# Patient Record
Sex: FEMALE | Race: WHITE | NOT HISPANIC OR LATINO | Employment: FULL TIME | URBAN - METROPOLITAN AREA
[De-identification: names, ages, dates, MRNs, and addresses within clinical notes are randomized per-mention and may not be internally consistent; named-entity substitution may affect disease eponyms.]

---
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
## 2023-01-10 ENCOUNTER — HOSPITAL ENCOUNTER (EMERGENCY)
Facility: HOSPITAL | Age: 18
Discharge: HOME | End: 2023-01-10
Attending: EMERGENCY MEDICINE | Admitting: EMERGENCY MEDICINE
Payer: COMMERCIAL

## 2023-01-10 ENCOUNTER — APPOINTMENT (OUTPATIENT)
Dept: CT IMAGING | Facility: HOSPITAL | Age: 18
End: 2023-01-10
Payer: COMMERCIAL

## 2023-01-10 ENCOUNTER — APPOINTMENT (OUTPATIENT)
Dept: GENERAL RADIOLOGY | Facility: HOSPITAL | Age: 18
End: 2023-01-10
Payer: COMMERCIAL

## 2023-01-10 VITALS
TEMPERATURE: 98 F | BODY MASS INDEX: 31.86 KG/M2 | WEIGHT: 203 LBS | SYSTOLIC BLOOD PRESSURE: 136 MMHG | HEART RATE: 76 BPM | HEIGHT: 67 IN | OXYGEN SATURATION: 99 % | RESPIRATION RATE: 16 BRPM | DIASTOLIC BLOOD PRESSURE: 76 MMHG

## 2023-01-10 DIAGNOSIS — R22.1 NECK SWELLING: ICD-10-CM

## 2023-01-10 DIAGNOSIS — B34.9 VIRAL SYNDROME: ICD-10-CM

## 2023-01-10 DIAGNOSIS — M54.50 ACUTE BILATERAL LOW BACK PAIN WITHOUT SCIATICA: Primary | ICD-10-CM

## 2023-01-10 LAB
ALBUMIN SERPL-MCNC: 4.6 G/DL (ref 3.5–5.2)
ALBUMIN/GLOB SERPL: 1.8 G/DL
ALP SERPL-CCNC: 56 U/L (ref 39–117)
ALT SERPL W P-5'-P-CCNC: 13 U/L (ref 1–33)
ANION GAP SERPL CALCULATED.3IONS-SCNC: 12.6 MMOL/L (ref 5–15)
AST SERPL-CCNC: 14 U/L (ref 1–32)
B-HCG UR QL: NEGATIVE
BASOPHILS # BLD AUTO: 0.04 10*3/MM3 (ref 0–0.2)
BASOPHILS NFR BLD AUTO: 0.6 % (ref 0–1.5)
BILIRUB SERPL-MCNC: 0.9 MG/DL (ref 0–1.2)
BILIRUB UR QL STRIP: ABNORMAL
BUN SERPL-MCNC: 13 MG/DL (ref 6–20)
BUN/CREAT SERPL: 17.1 (ref 7–25)
CALCIUM SPEC-SCNC: 9.8 MG/DL (ref 8.6–10.5)
CHLORIDE SERPL-SCNC: 102 MMOL/L (ref 98–107)
CLARITY UR: CLEAR
CO2 SERPL-SCNC: 22.4 MMOL/L (ref 22–29)
COLOR UR: YELLOW
CREAT SERPL-MCNC: 0.76 MG/DL (ref 0.57–1)
DEPRECATED RDW RBC AUTO: 40.9 FL (ref 37–54)
EGFRCR SERPLBLD CKD-EPI 2021: 115.9 ML/MIN/1.73
EOSINOPHIL # BLD AUTO: 0.03 10*3/MM3 (ref 0–0.4)
EOSINOPHIL NFR BLD AUTO: 0.4 % (ref 0.3–6.2)
ERYTHROCYTE [DISTWIDTH] IN BLOOD BY AUTOMATED COUNT: 13.2 % (ref 12.3–15.4)
FLUAV SUBTYP SPEC NAA+PROBE: NOT DETECTED
FLUBV RNA ISLT QL NAA+PROBE: NOT DETECTED
GLOBULIN UR ELPH-MCNC: 2.6 GM/DL
GLUCOSE SERPL-MCNC: 71 MG/DL (ref 65–99)
GLUCOSE UR STRIP-MCNC: NEGATIVE MG/DL
HCT VFR BLD AUTO: 45.1 % (ref 34–46.6)
HETEROPH AB SER QL LA: NEGATIVE
HGB BLD-MCNC: 14.9 G/DL (ref 12–15.9)
HGB UR QL STRIP.AUTO: NEGATIVE
IMM GRANULOCYTES # BLD AUTO: 0 10*3/MM3 (ref 0–0.05)
IMM GRANULOCYTES NFR BLD AUTO: 0 % (ref 0–0.5)
KETONES UR QL STRIP: ABNORMAL
LEUKOCYTE ESTERASE UR QL STRIP.AUTO: NEGATIVE
LYMPHOCYTES # BLD AUTO: 2.35 10*3/MM3 (ref 0.7–3.1)
LYMPHOCYTES NFR BLD AUTO: 32.6 % (ref 19.6–45.3)
MCH RBC QN AUTO: 27.7 PG (ref 26.6–33)
MCHC RBC AUTO-ENTMCNC: 33 G/DL (ref 31.5–35.7)
MCV RBC AUTO: 83.8 FL (ref 79–97)
MONOCYTES # BLD AUTO: 0.4 10*3/MM3 (ref 0.1–0.9)
MONOCYTES NFR BLD AUTO: 5.6 % (ref 5–12)
NEUTROPHILS NFR BLD AUTO: 4.38 10*3/MM3 (ref 1.7–7)
NEUTROPHILS NFR BLD AUTO: 60.8 % (ref 42.7–76)
NITRITE UR QL STRIP: NEGATIVE
PH UR STRIP.AUTO: 5.5 [PH] (ref 5–8)
PLATELET # BLD AUTO: 231 10*3/MM3 (ref 140–450)
PMV BLD AUTO: 11 FL (ref 6–12)
POTASSIUM SERPL-SCNC: 3.9 MMOL/L (ref 3.5–5.2)
PROT SERPL-MCNC: 7.2 G/DL (ref 6–8.5)
PROT UR QL STRIP: ABNORMAL
RBC # BLD AUTO: 5.38 10*6/MM3 (ref 3.77–5.28)
SARS-COV-2 RNA RESP QL NAA+PROBE: NOT DETECTED
SODIUM SERPL-SCNC: 137 MMOL/L (ref 136–145)
SP GR UR STRIP: >=1.03 (ref 1–1.03)
STREP A PCR: NOT DETECTED
UROBILINOGEN UR QL STRIP: ABNORMAL
WBC NRBC COR # BLD: 7.2 10*3/MM3 (ref 3.4–10.8)

## 2023-01-10 PROCEDURE — 81003 URINALYSIS AUTO W/O SCOPE: CPT | Performed by: NURSE PRACTITIONER

## 2023-01-10 PROCEDURE — 99283 EMERGENCY DEPT VISIT LOW MDM: CPT | Performed by: NURSE PRACTITIONER

## 2023-01-10 PROCEDURE — 80053 COMPREHEN METABOLIC PANEL: CPT | Performed by: NURSE PRACTITIONER

## 2023-01-10 PROCEDURE — 85025 COMPLETE CBC W/AUTO DIFF WBC: CPT | Performed by: NURSE PRACTITIONER

## 2023-01-10 PROCEDURE — 81025 URINE PREGNANCY TEST: CPT | Performed by: NURSE PRACTITIONER

## 2023-01-10 PROCEDURE — 87651 STREP A DNA AMP PROBE: CPT | Performed by: EMERGENCY MEDICINE

## 2023-01-10 PROCEDURE — 70491 CT SOFT TISSUE NECK W/DYE: CPT

## 2023-01-10 PROCEDURE — 99284 EMERGENCY DEPT VISIT MOD MDM: CPT

## 2023-01-10 PROCEDURE — 86308 HETEROPHILE ANTIBODY SCREEN: CPT | Performed by: NURSE PRACTITIONER

## 2023-01-10 PROCEDURE — 71045 X-RAY EXAM CHEST 1 VIEW: CPT

## 2023-01-10 PROCEDURE — 87636 SARSCOV2 & INF A&B AMP PRB: CPT | Performed by: EMERGENCY MEDICINE

## 2023-01-10 PROCEDURE — 0 IOPAMIDOL PER 1 ML: Performed by: EMERGENCY MEDICINE

## 2023-01-10 RX ORDER — ACETAMINOPHEN 500 MG
1000 TABLET ORAL ONCE
Status: COMPLETED | OUTPATIENT
Start: 2023-01-10 | End: 2023-01-10

## 2023-01-10 RX ORDER — SODIUM CHLORIDE 0.9 % (FLUSH) 0.9 %
10 SYRINGE (ML) INJECTION AS NEEDED
Status: DISCONTINUED | OUTPATIENT
Start: 2023-01-10 | End: 2023-01-10 | Stop reason: HOSPADM

## 2023-01-10 RX ADMIN — ACETAMINOPHEN 1000 MG: 500 TABLET, FILM COATED ORAL at 08:23

## 2023-01-10 RX ADMIN — SODIUM CHLORIDE 1000 ML: 9 INJECTION, SOLUTION INTRAVENOUS at 08:57

## 2023-01-10 RX ADMIN — IOPAMIDOL 100 ML: 755 INJECTION, SOLUTION INTRAVENOUS at 10:02

## 2023-01-10 NOTE — DISCHARGE INSTRUCTIONS
Rest today    Tylenol as needed for pain    Return Precautions    Although you are being discharged from the ED today, I encourage you to return for worsening symptoms.  Things can, and do, change such that treatment at home with medication may not be adequate.      Specifically, return for any of the following:    Chest pain, shortness of breath, pain or nausea and vomiting not controlled by medications provided.    Please make a follow up with your Primary Care Provider for a blood pressure recheck.

## 2023-01-10 NOTE — FSED PROVIDER NOTE
"EMERGENCY DEPARTMENT ENCOUNTER    Room Number:  02/02  Date seen:  1/10/2023  Time seen: 07:53 EST  PCP: Miriam Ya APRN  Historian: patient    HPI:  Chief complaint: pain with breathing, pain beneath tongue  A complete HPI/ROS/PMH/PSH/SH/FH are unobtainable due to: n/a  Context:Ashley Leone is a 17 y.o. female with h/o recent EGD who presents to the ED with c/o lower back pain that is bilateral and causing pain with breathing. Symptoms started Sunday evening.  She states she thought she had UTI due to urgency but \"not having to really urinate\".  She also complains of neck swelling and pain beneath her tongue.  Denies any recent dental work, different foods or that this symptom causes shortness of breath.  She denies sore throat, other body aches or fever/chills.  She is vaccinated against Covid 19.       The patient was placed in a mask in triage, hand hygiene was performed before and after my interaction with the patient.  I wore a mask, safety glasses and gloves during my entire interaction with the patient.    MEDICAL RECORD REVIEW    ALLERGIES  Latex, natural rubber    PAST MEDICAL HISTORY  Active Ambulatory Problems     Diagnosis Date Noted   • Rectal bleeding 12/06/2022   • Nausea and vomiting 12/06/2022     Resolved Ambulatory Problems     Diagnosis Date Noted   • No Resolved Ambulatory Problems     Past Medical History:   Diagnosis Date   • Abdominal pain 11/2022   • Acute sinus infection 11/2022   • Blisters of multiple sites    • Diarrhea 11/2022   • Ear infection 11/2022   • Family history of colitis    • Nausea & vomiting 11/2022       PAST SURGICAL HISTORY  Past Surgical History:   Procedure Laterality Date   • ADENOIDECTOMY     • ATRIAL SEPTAL DEFECT REPAIR  2005   • COLONOSCOPY N/A 12/6/2022    Procedure: COLONOSCOPY WITH BIOPSY X1 AREA;  Surgeon: Woodrow Valentino MD;  Location: [facility] ENDOSCOPY;  Service: Gastroenterology;  Laterality: N/A;  POST: NORMAL COLONOSCOPY   • ENDOSCOPY N/A " 12/6/2022    Procedure: ESOPHAGOGASTRODUODENOSCOPY WITH BIOPSY X 2 AREAS;  Surgeon: Woodrow Valentino MD;  Location: [facility] ENDOSCOPY;  Service: Gastroenterology;  Laterality: N/A;  POST: ESOPHAGITIS   • TONSILLECTOMY         FAMILY HISTORY  History reviewed. No pertinent family history.    SOCIAL HISTORY  Social History     Socioeconomic History   • Marital status: Single   Tobacco Use   • Smoking status: Never   • Smokeless tobacco: Never   Vaping Use   • Vaping Use: Every day   • Substances: Nicotine, THC, do not vape dos, stop thc now   Substance and Sexual Activity   • Alcohol use: Yes     Comment: rarely   • Drug use: Yes     Types: Marijuana     Comment: vapes thc       REVIEW OF SYSTEMS  Review of Systems    All systems reviewed and negative except for those discussed in HPI.     PHYSICAL EXAM    ED Triage Vitals [01/10/23 0750]   Temp Heart Rate Resp BP SpO2   98.6 °F (37 °C) 74 16 141/77 99 %      Temp src Heart Rate Source Patient Position BP Location FiO2 (%)   Oral Monitor Sitting Right arm --     Physical Exam  HENT:      Head: Normocephalic and atraumatic.      Right Ear: Hearing, tympanic membrane and ear canal normal. No mastoid tenderness.      Left Ear: Hearing normal. There is impacted cerumen. No mastoid tenderness.      Mouth/Throat:      Lips: Pink.      Mouth: Mucous membranes are moist.      Palate: No mass.      Pharynx: Oropharynx is clear. Uvula midline. No pharyngeal swelling, oropharyngeal exudate, posterior oropharyngeal erythema or uvula swelling.        Comments: Pt has double teeth as documented.    There is NO sublingual swelling          I have reviewed the triage vital signs and nursing notes.      GENERAL: not distressed  HENT: nares patent; see above  EYES: no scleral icterus  NECK: no ROM limitations  CV: regular rhythm, regular rate, +murmur  RESPIRATORY: normal effort, CTAB  ABDOMEN: soft  : deferred  MUSCULOSKELETAL: no deformity  NEURO: alert, moves all  extremities, follows commands  SKIN: warm, dry    LAB RESULTS  Recent Results (from the past 24 hour(s))   Rapid Strep A Screen - Swab, Throat    Collection Time: 01/10/23  7:54 AM    Specimen: Throat; Swab   Result Value Ref Range    STREP A PCR Not Detected Not Detected   COVID-19 and FLU A/B PCR - Swab, Nasopharynx    Collection Time: 01/10/23  7:54 AM    Specimen: Nasopharynx; Swab   Result Value Ref Range    COVID19 Not Detected Not Detected - Ref. Range    Influenza A PCR Not Detected Not Detected    Influenza B PCR Not Detected Not Detected   Urinalysis without microscopic (no culture) - Urine, Clean Catch    Collection Time: 01/10/23  8:24 AM    Specimen: Urine, Clean Catch   Result Value Ref Range    Color, UA Yellow Yellow, Straw    Appearance, UA Clear Clear    pH, UA 5.5 5.0 - 8.0    Specific Gravity, UA >=1.030 1.005 - 1.030    Glucose, UA Negative Negative    Ketones, UA 40 mg/dL (2+) (A) Negative    Bilirubin, UA Small (1+) (A) Negative    Blood, UA Negative Negative    Protein, UA 30 mg/dL (1+) (A) Negative    Leuk Esterase, UA Negative Negative    Nitrite, UA Negative Negative    Urobilinogen, UA 0.2 E.U./dL 0.2 - 1.0 E.U./dL   Pregnancy, Urine - Urine, Clean Catch    Collection Time: 01/10/23  8:24 AM    Specimen: Urine, Clean Catch   Result Value Ref Range    HCG, Urine QL Negative Negative   Comprehensive Metabolic Panel    Collection Time: 01/10/23  8:56 AM    Specimen: Blood   Result Value Ref Range    Glucose 71 65 - 99 mg/dL    BUN 13 6 - 20 mg/dL    Creatinine 0.76 0.57 - 1.00 mg/dL    Sodium 137 136 - 145 mmol/L    Potassium 3.9 3.5 - 5.2 mmol/L    Chloride 102 98 - 107 mmol/L    CO2 22.4 22.0 - 29.0 mmol/L    Calcium 9.8 8.6 - 10.5 mg/dL    Total Protein 7.2 6.0 - 8.5 g/dL    Albumin 4.6 3.5 - 5.2 g/dL    ALT (SGPT) 13 1 - 33 U/L    AST (SGOT) 14 1 - 32 U/L    Alkaline Phosphatase 56 39 - 117 U/L    Total Bilirubin 0.9 0.0 - 1.2 mg/dL    Globulin 2.6 gm/dL    A/G Ratio 1.8 g/dL     BUN/Creatinine Ratio 17.1 7.0 - 25.0    Anion Gap 12.6 5.0 - 15.0 mmol/L    eGFR 115.9 >60.0 mL/min/1.73   CBC Auto Differential    Collection Time: 01/10/23  8:56 AM    Specimen: Blood   Result Value Ref Range    WBC 7.20 3.40 - 10.80 10*3/mm3    RBC 5.38 (H) 3.77 - 5.28 10*6/mm3    Hemoglobin 14.9 12.0 - 15.9 g/dL    Hematocrit 45.1 34.0 - 46.6 %    MCV 83.8 79.0 - 97.0 fL    MCH 27.7 26.6 - 33.0 pg    MCHC 33.0 31.5 - 35.7 g/dL    RDW 13.2 12.3 - 15.4 %    RDW-SD 40.9 37.0 - 54.0 fl    MPV 11.0 6.0 - 12.0 fL    Platelets 231 140 - 450 10*3/mm3    Neutrophil % 60.8 42.7 - 76.0 %    Lymphocyte % 32.6 19.6 - 45.3 %    Monocyte % 5.6 5.0 - 12.0 %    Eosinophil % 0.4 0.3 - 6.2 %    Basophil % 0.6 0.0 - 1.5 %    Immature Grans % 0.0 0.0 - 0.5 %    Neutrophils, Absolute 4.38 1.70 - 7.00 10*3/mm3    Lymphocytes, Absolute 2.35 0.70 - 3.10 10*3/mm3    Monocytes, Absolute 0.40 0.10 - 0.90 10*3/mm3    Eosinophils, Absolute 0.03 0.00 - 0.40 10*3/mm3    Basophils, Absolute 0.04 0.00 - 0.20 10*3/mm3    Immature Grans, Absolute 0.00 0.00 - 0.05 10*3/mm3   Mononucleosis Screen    Collection Time: 01/10/23  8:56 AM    Specimen: Blood   Result Value Ref Range    Monospot Negative Negative         RADIOLOGY RESULTS  CT Soft Tissue Neck With Contrast    Result Date: 1/10/2023  CT SOFT TISSUE NECK W CONTRAST Date of Exam: 1/10/2023 10:01 AM EST Indication: pt complains of pain beneath tongue and neck swelling.. Comparison: None available. Technique: Axial CT images were obtained of the neck after the uneventful intravenous administration of iodinated contrast.  Sagittal and coronal reconstructions were performed.  Automated exposure control and iterative reconstruction methods were used. Findings: There is no evidence of a neck mass or lymphadenopathy. The salivary and thyroid glands appear symmetric without focal mass lesion or definite inflammation. The parapharyngeal, pharyngeal mucosal, carotid, parotid, , buccal,  prevertebral and retropharyngeal suprahyoid spaces of the neck appear within normal limits.  The infrahyoid neck structures appear within normal limits.  The epiglottis, aryepiglottic folds, false vocal cords, vocal ligaments and subglottic airway appear within normal limits.  The hyoid bone, thyroid and cricoid cartilages appear within normal limits.  The visualized trachea and esophagus are unremarkable. The oral cavity, tongue, base of tongue and floor of mouth appear unremarkable. The lung apices are clear area superficial soft tissues appear unremarkable. Limited view of the intracranial contents is within normal limits.  There is normal contrast opacification of the neck vasculature.  The orbits appear unremarkable.  There is a large mucus retention cyst in the right maxillary sinus. The mastoid air cells appear well aerated.There are no acute osseous abnormality is or destructive bone lesions.     Impression: No acute findings of the neck. Electronically Signed: Ti Kowalski  1/10/2023 10:16 AM EST  Workstation ID: [id number]    XR Chest 1 View    Result Date: 1/10/2023  XR CHEST 1 VW Date of Exam: 1/10/2023 8:50 AM EST Indication: bilateral base of lung pain, pain with deep breaths. Comparison: None available. Findings: The lungs are clear bilaterally. Pleural spaces are normal. Cardiac and mediastinal contours are within normal limits. Regional skeleton is within normal limits for age.     Impression: 1. No acute cardiopulmonary disease. Electronically Signed: Jose Antonio Barrios  1/10/2023 9:07 AM EST  Workstation ID: [id number]         PROGRESS, DATA ANALYSIS, CONSULTS AND MEDICAL DECISION MAKING  All labs have been independently reviewed by me.  All radiology studies have been reviewed by me and discussed with radiologist dictating the report.  EKG's independently viewed and interpreted by me unless stated otherwise. Discussion below represents my analysis of pertinent findings related to patient's condition,  differential diagnosis, treatment plan and final disposition.       Social Determinants of Health     Financial Resource Strain: Not on file   Food Insecurity: Not on file   Transportation Needs: Not on file   Physical Activity: Not on file   Stress: Not on file   Social Connections: Not on file   Intimate Partner Violence: Not on file   Housing Stability: Not on file       Orders placed during this visit:  Orders Placed This Encounter   Procedures   • Rapid Strep A Screen - Swab, Throat   • COVID-19 and FLU A/B PCR - Swab, Nasopharynx   • XR Chest 1 View   • CT Soft Tissue Neck With Contrast   • Urinalysis without microscopic (no culture) - Urine, Clean Catch   • Pregnancy, Urine - Urine, Clean Catch   • Comprehensive Metabolic Panel   • CBC Auto Differential   • Mononucleosis Screen   • Insert peripheral IV   • Blood Draw With IV Start   • CBC & Differential   • ED Acknowledgement Form Needed;     DDX: viral syndrome, dental issue, allergic reaction, UTI, pyelo    ED Course as of 01/10/23 1044   Tue Elder 10, 2023   0856 I updated the patient about negative strep, covid, influenza.  Pregnancy test is negative.  Due to complaint of neck swelling and lingual discomfort I will check labs.   [EW]   1016 I viewed chest x-ray in PACS.  My interpretation is no acute infiltrate [EW]   1022 CT is negative for any acute findings in neck, epiglottis.  She is not having any airway complaints.  There is no stridor, no problems speaking, swallowing or managing her secretions.  Other workup including labs and CXR are unremarkable.  Will have her follow up with Primary Care Provider.  [EW]      ED Course User Index  [EW] Christy Watson APRN       MDM    DIAGNOSIS  Final diagnoses:   Acute bilateral low back pain without sciatica   Neck swelling   Viral syndrome       FOLLOW-UP  Miriam Ya APRN  [address] [place] [address] [phone]    Schedule an appointment as soon as possible for a visit            Latest Documented Vital Signs:  As of 10:44 EST  BP- 136/76 HR- 76 Temp- 98 °F (36.7 °C) (Oral) O2 sat- 99%    Please note that portions of this were completed with a voice recognition program.     Note Disclaimer: At [facility], we believe that sharing information builds trust and better relationships. You are receiving this note because you are receiving care at [facility] or recently visited. It is possible you will see health information before a provider has talked with you about it. This kind of information can be easy to misunderstand. To help you fully understand what it means for your health, we urge you to discuss this note with your provider.

## 2023-01-10 NOTE — Clinical Note
[facility] FSED [place] [id number] [place] [address] [place] [address]  Phone: [phone]    Ashley Leone was seen and treated in our emergency department on 1/10/2023.  She may return to work on 01/11/2023.         Thank you for choosing [facility].    Christy Watson APRN

## 2023-03-23 ENCOUNTER — OFFICE (OUTPATIENT)
Dept: URBAN - METROPOLITAN AREA CLINIC 64 | Facility: CLINIC | Age: 18
End: 2023-03-23

## 2023-03-23 VITALS
DIASTOLIC BLOOD PRESSURE: 71 MMHG | WEIGHT: 182 LBS | HEIGHT: 66 IN | HEART RATE: 67 BPM | BODY MASS INDEX: 29.25 KG/M2 | SYSTOLIC BLOOD PRESSURE: 120 MMHG

## 2023-03-23 DIAGNOSIS — R63.0 ANOREXIA: ICD-10-CM

## 2023-03-23 DIAGNOSIS — R10.10 UPPER ABDOMINAL PAIN, UNSPECIFIED: ICD-10-CM

## 2023-03-23 DIAGNOSIS — F12.10 CANNABIS ABUSE, UNCOMPLICATED: ICD-10-CM

## 2023-03-23 DIAGNOSIS — R13.10 DYSPHAGIA, UNSPECIFIED: ICD-10-CM

## 2023-03-23 DIAGNOSIS — K21.00 GASTRO-ESOPHAGEAL REFLUX DISEASE WITH ESOPHAGITIS, WITHOUT B: ICD-10-CM

## 2023-03-23 DIAGNOSIS — R19.4 CHANGE IN BOWEL HABIT: ICD-10-CM

## 2023-03-23 DIAGNOSIS — R11.0 NAUSEA: ICD-10-CM

## 2023-03-23 PROCEDURE — 99214 OFFICE O/P EST MOD 30 MIN: CPT | Performed by: NURSE PRACTITIONER

## 2023-03-23 RX ORDER — ESOMEPRAZOLE MAGNESIUM 40 MG/1
40 CAPSULE, DELAYED RELEASE ORAL
Qty: 90 | Refills: 3 | Status: ACTIVE
Start: 2023-03-23

## 2023-05-24 ENCOUNTER — OFFICE (OUTPATIENT)
Dept: URBAN - METROPOLITAN AREA CLINIC 64 | Facility: CLINIC | Age: 18
End: 2023-05-24

## 2023-05-24 VITALS
HEART RATE: 66 BPM | BODY MASS INDEX: 27.16 KG/M2 | WEIGHT: 169 LBS | HEIGHT: 66 IN | SYSTOLIC BLOOD PRESSURE: 131 MMHG | DIASTOLIC BLOOD PRESSURE: 85 MMHG

## 2023-05-24 DIAGNOSIS — R10.30 LOWER ABDOMINAL PAIN, UNSPECIFIED: ICD-10-CM

## 2023-05-24 DIAGNOSIS — K21.9 GASTRO-ESOPHAGEAL REFLUX DISEASE WITHOUT ESOPHAGITIS: ICD-10-CM

## 2023-05-24 DIAGNOSIS — K20.0 EOSINOPHILIC ESOPHAGITIS: ICD-10-CM

## 2023-05-24 DIAGNOSIS — R19.4 CHANGE IN BOWEL HABIT: ICD-10-CM

## 2023-05-24 PROCEDURE — 99214 OFFICE O/P EST MOD 30 MIN: CPT | Performed by: NURSE PRACTITIONER

## 2023-05-24 RX ORDER — HYOSCYAMINE SULFATE 0.38 MG/1
0.75 TABLET ORAL
Qty: 60 | Refills: 11 | Status: ACTIVE
Start: 2023-05-24

## 2023-07-11 ENCOUNTER — OFFICE (OUTPATIENT)
Dept: URBAN - METROPOLITAN AREA CLINIC 64 | Facility: CLINIC | Age: 18
End: 2023-07-11

## 2023-07-11 VITALS
HEIGHT: 66 IN | HEART RATE: 67 BPM | WEIGHT: 165 LBS | SYSTOLIC BLOOD PRESSURE: 112 MMHG | BODY MASS INDEX: 26.52 KG/M2 | DIASTOLIC BLOOD PRESSURE: 74 MMHG

## 2023-07-11 DIAGNOSIS — R10.30 LOWER ABDOMINAL PAIN, UNSPECIFIED: ICD-10-CM

## 2023-07-11 DIAGNOSIS — K20.0 EOSINOPHILIC ESOPHAGITIS: ICD-10-CM

## 2023-07-11 DIAGNOSIS — R19.4 CHANGE IN BOWEL HABIT: ICD-10-CM

## 2023-07-11 PROCEDURE — 99214 OFFICE O/P EST MOD 30 MIN: CPT | Performed by: NURSE PRACTITIONER

## 2023-07-11 RX ORDER — MULTIVIT-MIN/IRON/FOLIC ACID/K 18-600-40
400 CAPSULE ORAL
Qty: 30 | Refills: 5 | Status: ACTIVE
Start: 2023-07-11

## 2023-08-07 ENCOUNTER — TELEPHONE (OUTPATIENT)
Dept: ONCOLOGY | Facility: CLINIC | Age: 18
End: 2023-08-07
Payer: COMMERCIAL

## 2023-08-07 NOTE — TELEPHONE ENCOUNTER
JUAN MANUELM TO SCHEDULE NEW PT ONC APPT FROM REFERRAL TO DR. DONG.    [facility]  [address]  [place] [address]  [phone]    2022    Re: Andrés Kelly   :   2005  MRN:  [id number]   REFERRING PHYSICIAN:   Sebastián LEE [facility]    Date of Initial Evaluation:  21  Visits:  Rxs Used: 18  Reason for Referral:     S/P ACL reconstruction  Status post lateral meniscus repair    PROGRESS  REPORT  Progress reporting period is from 21 to 3/25/22.     SUBJECTIVE  Subjective: pt is 22+ weeks post op and reports he has been wearing a brace for running and activity and overall feels good.   Current Pain level: 0/10    The objective findings are from DOS 3/25/22.    OBJECTIVE  Objective: good tolerance to moderate intensity agility drills painfree. pt brought brace to clinic today but removed after a few mins of dynamic activity.      ASSESSMENT/PLAN  Updated problem list and treatment plan: Diagnosis 1:  S/p R ACLR and lat meniscus repair  STG/LTGs have been met or progress has been made towards goals:  Yes, progressing to return to sport.  Assessment of Progress: The patient's condition is improving.  The patient's condition has potential to improve.  Self Management Plans:  Patient is independent in a home treatment program.  I have re-evaluated this patient and find that the nature, scope, duration and intensity of the therapy is appropriate for the medical condition of the patient.  Amr continues to require the following intervention to meet STG and LTG's: PT    Recommendations:  Pt is 22+ weeks post op R ACLR and lat meniscus repair and is running and able perform light agility drills painfree. He is seemingly on track to gradually progress agility and eventual transition to return to training program like [employer]  Around the 6-7month franny and return to sport around 9months at earliest.    Thank you for your referral.    INQUIRIES  Therapist: Franky Mobley  [place]  [facility] [place] [facility]  [address] [place] [address]  Phone: [phone]  Fax: [phone]

## 2023-08-07 NOTE — PROGRESS NOTES
HEMATOLOGY ONCOLOGY OUTPATIENT CONSULTATION       Patient name: Ashley eLone  : 2005  MRN: [id number]  Primary Care Physician: Miriam Ya APRN  Referring Physician: Miriam Ya APRN  Reason For Consult: Spinal      History of Present Illness:  Patient is a 18 y.o. female with history of colitis, eosinophilic esophagitis who presented with T8 spinal mass.  Patient has been having lower back pain radiating to the side bilaterally over the last 2 years.  Patient states that her pain was getting worse which prompted her to get further attention.    2023 -MRI thoracic spine without contrast with smoothly marginated ovoid mass in the dorsal canal at the T8 level, dorsal to the thoracic spinal cord measuring up to 2.9 cm.    2023 -MRI thoracic spine with intrathecal mass centered at the T8 level measuring nearly 3 cm and cephalic caudal extension      Subjective:  Patient presents for initial consultation.  She also complains of weight loss over the last few years.  No urinary incontinence, she has had history of colitis and that can sometimes cause her to have bowel incontinence.  She has had difficulty swallowing with esophagitis and has had a weight loss because of that.      Past Medical History:   Diagnosis Date    Abdominal pain 2022    every day since     Acute sinus infection 2022    Blisters of multiple sites     inner thigh, but healing    Diarrhea 2022    Ear infection 2022    double    Family history of colitis     Nausea & vomiting 2022    on and off       Past Surgical History:   Procedure Laterality Date    ADENOIDECTOMY      ATRIAL SEPTAL DEFECT REPAIR      COLONOSCOPY N/A 2022    Procedure: COLONOSCOPY WITH BIOPSY X1 AREA;  Surgeon: Woodrow Valentino MD;  Location: [facility] ENDOSCOPY;  Service: Gastroenterology;  Laterality: N/A;  POST: NORMAL COLONOSCOPY    ENDOSCOPY N/A 2022    Procedure: ESOPHAGOGASTRODUODENOSCOPY  WITH BIOPSY X 2 AREAS;  Surgeon: Woodrow Valentino MD;  Location: [facility] ENDOSCOPY;  Service: Gastroenterology;  Laterality: N/A;  POST: ESOPHAGITIS    TONSILLECTOMY           Current Outpatient Medications:     Cholecalciferol (Vitamin D3) 1.25 MG (97906 UT) capsule, Take 1 capsule by mouth 1 (One) Time Per Week., Disp: , Rfl:     cyclobenzaprine (FLEXERIL) 10 MG tablet, Take 1 tablet by mouth 3 (Three) Times a Day As Needed. for muscle spams, Disp: , Rfl:     gabapentin (NEURONTIN) 300 MG capsule, TAKE 1 CAPSULE BY MOUTH TWICE DAILY AS NEEDED FOR NERVE PAIN, Disp: , Rfl:     meloxicam (MOBIC) 15 MG tablet, , Disp: , Rfl:     Allergies   Allergen Reactions    Latex, Natural Rubber Other (See Comments)     Blisters that leave scars       No family history on file.    Cancer-related family history is not on file.      Social History     Tobacco Use    Smoking status: Never    Smokeless tobacco: Never   Vaping Use    Vaping Use: Every day    Substances: Nicotine, THC, do not vape dos, stop thc now   Substance Use Topics    Alcohol use: Yes     Comment: rarely    Drug use: Yes     Types: Marijuana     Comment: vapes thc     Social History     Social History Narrative    Not on file       ROS:   Review of Systems   Constitutional:  Positive for fatigue and unexpected weight change. Negative for fever.   HENT:  Negative for congestion and nosebleeds.    Eyes:  Negative for pain.   Respiratory:  Negative for cough and shortness of breath.    Cardiovascular:  Negative for chest pain.   Gastrointestinal:  Negative for abdominal pain, blood in stool, diarrhea, nausea and vomiting.   Endocrine: Negative for cold intolerance and heat intolerance.   Genitourinary:  Negative for difficulty urinating.   Musculoskeletal:  Positive for back pain and myalgias. Negative for arthralgias.   Skin:  Negative for rash.   Neurological:  Positive for weakness. Negative for dizziness and headaches.   Hematological:  Does not  "bruise/bleed easily.   Psychiatric/Behavioral:  Negative for behavioral problems.        Objective:    Vital Signs:  Vitals:    08/09/23 1432   BP: 120/76   Pulse: 72   Resp: 16   Temp: 98.2 øF (36.8 øC)   SpO2: 98%   Weight: 70.7 kg (155 lb 12.8 oz)   Height: 170.2 cm (67\")   PainSc:   5   PainLoc: Back     Body mass index is 24.4 kg/mý.    ECOG  (0) Fully active, able to carry on all predisease performance without restriction    Physical Exam:   Physical Exam  Constitutional:       Appearance: Normal appearance.   HENT:      Head: Normocephalic and atraumatic.   Eyes:      Pupils: Pupils are equal, round, and reactive to light.   Cardiovascular:      Rate and Rhythm: Normal rate and regular rhythm.      Pulses: Normal pulses.      Heart sounds: No murmur heard.  Pulmonary:      Effort: Pulmonary effort is normal.      Breath sounds: Normal breath sounds.   Abdominal:      General: There is no distension.      Palpations: Abdomen is soft. There is no mass.      Tenderness: There is no abdominal tenderness.   Musculoskeletal:         General: Normal range of motion.      Cervical back: Normal range of motion and neck supple.   Skin:     General: Skin is warm.   Neurological:      General: No focal deficit present.      Mental Status: She is alert.   Psychiatric:         Mood and Affect: Mood normal.       Lab Results - Last 18 Months   Lab Units 01/10/23  0856   WBC 10*3/mm3 7.20   HEMOGLOBIN g/dL 14.9   HEMATOCRIT % 45.1   PLATELETS 10*3/mm3 231   MCV fL 83.8     Lab Results - Last 18 Months   Lab Units 01/10/23  0856   SODIUM mmol/L 137   POTASSIUM mmol/L 3.9   CHLORIDE mmol/L 102   CO2 mmol/L 22.4   BUN mg/dL 13   CREATININE mg/dL 0.76   CALCIUM mg/dL 9.8   BILIRUBIN mg/dL 0.9   ALK PHOS U/L 56   ALT (SGPT) U/L 13   AST (SGOT) U/L 14   GLUCOSE mg/dL 71       Lab Results   Component Value Date    GLUCOSE 71 01/10/2023    BUN 13 01/10/2023    CREATININE 0.76 01/10/2023    EGFRIFNONA 104 01/22/2022    BCR 17.1 " 01/10/2023    K 3.9 01/10/2023    CO2 22.4 01/10/2023    CALCIUM 9.8 01/10/2023    ALBUMIN 4.6 01/10/2023    AST 14 01/10/2023    ALT 13 01/10/2023       No results for input(s): APTT, INR, PTT in the last 09754 hours.    No results found for: IRON, TIBC, FERRITIN    No results found for: FOLATE    No results found for: OCCULTBLD    No results found for: RETICCTPCT  No results found for: OAOGLDKS75  No results found for: SPEP, UPEP  No results found for: LDH, URICACID  No results found for: ALBERT, RF, SEDRATE  No results found for: FIBRINOGEN, HAPTOGLOBIN  No results found for: PTT, INR  No results found for:   No results found for: CEA  No components found for: CA-19-9  No results found for: PSA  No results found for: SEDRATE       Assessment & Plan     Patient is a 18-year-old female with recently diagnosed thoracic spinal intrathecal mass    Intrathecal mass  Etiology include benign mass such as a meningioma versus more malignant etiology  I will refer to neurosurgery and discussed with them with potentially biopsy versus surgical resection versus observation of this thought to be a benign mass  Currently symptomatically she has back pain which could be related to the mass.    I will follow-up after neurosurgical evaluation      Thank you very much for providing the opportunity to participate in this patient's care. Please do not hesitate to call if there are any other questions.

## 2023-08-09 ENCOUNTER — CONSULT (OUTPATIENT)
Dept: ONCOLOGY | Facility: CLINIC | Age: 18
End: 2023-08-09
Payer: COMMERCIAL

## 2023-08-09 VITALS
WEIGHT: 155.8 LBS | RESPIRATION RATE: 16 BRPM | SYSTOLIC BLOOD PRESSURE: 120 MMHG | TEMPERATURE: 98.2 F | HEIGHT: 67 IN | OXYGEN SATURATION: 98 % | HEART RATE: 72 BPM | BODY MASS INDEX: 24.45 KG/M2 | DIASTOLIC BLOOD PRESSURE: 76 MMHG

## 2023-08-09 DIAGNOSIS — M89.8X9 BONE MASS: ICD-10-CM

## 2023-08-09 DIAGNOSIS — G95.89 SPINAL CORD MASS: Primary | ICD-10-CM

## 2023-08-09 PROCEDURE — 99204 OFFICE O/P NEW MOD 45 MIN: CPT | Performed by: INTERNAL MEDICINE

## 2023-08-09 RX ORDER — CYCLOBENZAPRINE HCL 10 MG
10 TABLET ORAL 3 TIMES DAILY PRN
COMMUNITY
Start: 2023-06-01

## 2023-08-09 RX ORDER — MELOXICAM 15 MG/1
TABLET ORAL
COMMUNITY
Start: 2023-08-03

## 2023-08-09 RX ORDER — CHOLECALCIFEROL (VITAMIN D3) 1250 MCG
1 CAPSULE ORAL WEEKLY
COMMUNITY
Start: 2023-07-03

## 2023-08-09 RX ORDER — GABAPENTIN 300 MG/1
CAPSULE ORAL
COMMUNITY
Start: 2023-07-23

## 2023-08-09 NOTE — LETTER
2023     NETTA Child  [address] [place] [address]    Patient: Ashley Leone   YOB: 2005   Date of Visit: 2023     Dear NETTA Child:       Thank you for referring Ashley Leone to me for evaluation. Below are the relevant portions of my assessment and plan of care.    If you have questions, please do not hesitate to call me. I look forward to following Ashley along with you.         Sincerely,        Jacqueline Kearns MD        CC: No Recipients    Jacqueline Kearns MD  23 1629  Sign when Signing Visit                           HEMATOLOGY ONCOLOGY OUTPATIENT CONSULTATION       Patient name: Ashley Leone  : 2005  MRN: [id number]  Primary Care Physician: Miriam Ya APRN  Referring Physician: Miriam Ya APRN  Reason For Consult: Spinal      History of Present Illness:  Patient is a 18 y.o. female with history of colitis, eosinophilic esophagitis who presented with T8 spinal mass.  Patient has been having lower back pain radiating to the side bilaterally over the last 2 years.  Patient states that her pain was getting worse which prompted her to get further attention.    2023 -MRI thoracic spine without contrast with smoothly marginated ovoid mass in the dorsal canal at the T8 level, dorsal to the thoracic spinal cord measuring up to 2.9 cm.    2023 -MRI thoracic spine with intrathecal mass centered at the T8 level measuring nearly 3 cm and cephalic caudal extension      Subjective:  Patient presents for initial consultation.  She also complains of weight loss over the last few years.  No urinary incontinence, she has had history of colitis and that can sometimes cause her to have bowel incontinence.  She has had difficulty swallowing with esophagitis and has had a weight loss because of that.      Past Medical History:   Diagnosis Date    Abdominal pain 2022    every day since     Acute sinus infection 2022    Blisters of multiple  sites     inner thigh, but healing    Diarrhea 11/2022    Ear infection 11/2022    double    Family history of colitis     Nausea & vomiting 11/2022    on and off       Past Surgical History:   Procedure Laterality Date    ADENOIDECTOMY      ATRIAL SEPTAL DEFECT REPAIR  2005    COLONOSCOPY N/A 12/6/2022    Procedure: COLONOSCOPY WITH BIOPSY X1 AREA;  Surgeon: Woodrow Valentino MD;  Location: [facility] ENDOSCOPY;  Service: Gastroenterology;  Laterality: N/A;  POST: NORMAL COLONOSCOPY    ENDOSCOPY N/A 12/6/2022    Procedure: ESOPHAGOGASTRODUODENOSCOPY WITH BIOPSY X 2 AREAS;  Surgeon: Woodrow Valentino MD;  Location: [facility] ENDOSCOPY;  Service: Gastroenterology;  Laterality: N/A;  POST: ESOPHAGITIS    TONSILLECTOMY           Current Outpatient Medications:     Cholecalciferol (Vitamin D3) 1.25 MG (87195 UT) capsule, Take 1 capsule by mouth 1 (One) Time Per Week., Disp: , Rfl:     cyclobenzaprine (FLEXERIL) 10 MG tablet, Take 1 tablet by mouth 3 (Three) Times a Day As Needed. for muscle spams, Disp: , Rfl:     gabapentin (NEURONTIN) 300 MG capsule, TAKE 1 CAPSULE BY MOUTH TWICE DAILY AS NEEDED FOR NERVE PAIN, Disp: , Rfl:     meloxicam (MOBIC) 15 MG tablet, , Disp: , Rfl:     Allergies   Allergen Reactions    Latex, Natural Rubber Other (See Comments)     Blisters that leave scars       No family history on file.    Cancer-related family history is not on file.      Social History     Tobacco Use    Smoking status: Never    Smokeless tobacco: Never   Vaping Use    Vaping Use: Every day    Substances: Nicotine, THC, do not vape dos, stop thc now   Substance Use Topics    Alcohol use: Yes     Comment: rarely    Drug use: Yes     Types: Marijuana     Comment: vapes thc     Social History     Social History Narrative    Not on file       ROS:   Review of Systems   Constitutional:  Positive for fatigue and unexpected weight change. Negative for fever.   HENT:  Negative for congestion and  "nosebleeds.    Eyes:  Negative for pain.   Respiratory:  Negative for cough and shortness of breath.    Cardiovascular:  Negative for chest pain.   Gastrointestinal:  Negative for abdominal pain, blood in stool, diarrhea, nausea and vomiting.   Endocrine: Negative for cold intolerance and heat intolerance.   Genitourinary:  Negative for difficulty urinating.   Musculoskeletal:  Positive for back pain and myalgias. Negative for arthralgias.   Skin:  Negative for rash.   Neurological:  Positive for weakness. Negative for dizziness and headaches.   Hematological:  Does not bruise/bleed easily.   Psychiatric/Behavioral:  Negative for behavioral problems.        Objective:    Vital Signs:  Vitals:    08/09/23 1432   BP: 120/76   Pulse: 72   Resp: 16   Temp: 98.2 øF (36.8 øC)   SpO2: 98%   Weight: 70.7 kg (155 lb 12.8 oz)   Height: 170.2 cm (67\")   PainSc:   5   PainLoc: Back     Body mass index is 24.4 kg/mý.    ECOG  (0) Fully active, able to carry on all predisease performance without restriction    Physical Exam:   Physical Exam  Constitutional:       Appearance: Normal appearance.   HENT:      Head: Normocephalic and atraumatic.   Eyes:      Pupils: Pupils are equal, round, and reactive to light.   Cardiovascular:      Rate and Rhythm: Normal rate and regular rhythm.      Pulses: Normal pulses.      Heart sounds: No murmur heard.  Pulmonary:      Effort: Pulmonary effort is normal.      Breath sounds: Normal breath sounds.   Abdominal:      General: There is no distension.      Palpations: Abdomen is soft. There is no mass.      Tenderness: There is no abdominal tenderness.   Musculoskeletal:         General: Normal range of motion.      Cervical back: Normal range of motion and neck supple.   Skin:     General: Skin is warm.   Neurological:      General: No focal deficit present.      Mental Status: She is alert.   Psychiatric:         Mood and Affect: Mood normal.       Lab Results - Last 18 Months   Lab Units " 01/10/23  0856   WBC 10*3/mm3 7.20   HEMOGLOBIN g/dL 14.9   HEMATOCRIT % 45.1   PLATELETS 10*3/mm3 231   MCV fL 83.8     Lab Results - Last 18 Months   Lab Units 01/10/23  0856   SODIUM mmol/L 137   POTASSIUM mmol/L 3.9   CHLORIDE mmol/L 102   CO2 mmol/L 22.4   BUN mg/dL 13   CREATININE mg/dL 0.76   CALCIUM mg/dL 9.8   BILIRUBIN mg/dL 0.9   ALK PHOS U/L 56   ALT (SGPT) U/L 13   AST (SGOT) U/L 14   GLUCOSE mg/dL 71       Lab Results   Component Value Date    GLUCOSE 71 01/10/2023    BUN 13 01/10/2023    CREATININE 0.76 01/10/2023    EGFRIFNONA 104 01/22/2022    BCR 17.1 01/10/2023    K 3.9 01/10/2023    CO2 22.4 01/10/2023    CALCIUM 9.8 01/10/2023    ALBUMIN 4.6 01/10/2023    AST 14 01/10/2023    ALT 13 01/10/2023       No results for input(s): APTT, INR, PTT in the last 03294 hours.    No results found for: IRON, TIBC, FERRITIN    No results found for: FOLATE    No results found for: OCCULTBLD    No results found for: RETICCTPCT  No results found for: RDOMYDAJ28  No results found for: SPEP, UPEP  No results found for: LDH, URICACID  No results found for: ALBERT, RF, SEDRATE  No results found for: FIBRINOGEN, HAPTOGLOBIN  No results found for: PTT, INR  No results found for:   No results found for: CEA  No components found for: CA-19-9  No results found for: PSA  No results found for: SEDRATE       Assessment & Plan     Patient is a 18-year-old female with recently diagnosed thoracic spinal intrathecal mass    Intrathecal mass  Etiology include benign mass such as a meningioma versus more malignant etiology  I will refer to neurosurgery and discussed with them with potentially biopsy versus surgical resection versus observation of this thought to be a benign mass  Currently symptomatically she has back pain which could be related to the mass.    I will follow-up after neurosurgical evaluation      Thank you very much for providing the opportunity to participate in this patient's care. Please do not hesitate to  call if there are any other questions.

## 2023-08-24 ENCOUNTER — TELEPHONE (OUTPATIENT)
Dept: NEUROSURGERY | Facility: CLINIC | Age: 18
End: 2023-08-24

## 2023-08-24 NOTE — TELEPHONE ENCOUNTER
Patient came in office today. But [facility] did not give her the correct disc she needed for appt. Patient spoke with manager in office. She will call office once she has correct disc to be reschedule.

## 2023-08-31 ENCOUNTER — TELEPHONE (OUTPATIENT)
Dept: NEUROSURGERY | Facility: CLINIC | Age: 18
End: 2023-08-31
Payer: COMMERCIAL

## 2023-09-05 ENCOUNTER — TELEPHONE (OUTPATIENT)
Dept: NEUROSURGERY | Facility: CLINIC | Age: 18
End: 2023-09-05
Payer: COMMERCIAL

## 2023-09-05 NOTE — TELEPHONE ENCOUNTER
Called patient. Informed patient due to  being sick her appt needed to be rescheduled. Rescheduled patient for 09/12/2023 at 9:45 am. Patient stated she was able to get 1 of her disc for the thoracic.

## 2023-09-12 ENCOUNTER — OFFICE VISIT (OUTPATIENT)
Dept: NEUROSURGERY | Facility: CLINIC | Age: 18
End: 2023-09-12
Payer: COMMERCIAL

## 2023-09-12 VITALS
HEIGHT: 67 IN | DIASTOLIC BLOOD PRESSURE: 80 MMHG | SYSTOLIC BLOOD PRESSURE: 124 MMHG | WEIGHT: 155 LBS | BODY MASS INDEX: 24.33 KG/M2

## 2023-09-12 DIAGNOSIS — G96.198 ARACHNOID CYST OF SPINE: Primary | ICD-10-CM

## 2023-09-12 PROCEDURE — 99204 OFFICE O/P NEW MOD 45 MIN: CPT | Performed by: NEUROLOGICAL SURGERY

## 2023-12-11 ENCOUNTER — APPOINTMENT (OUTPATIENT)
Dept: CT IMAGING | Facility: HOSPITAL | Age: 18
End: 2023-12-11
Payer: COMMERCIAL

## 2023-12-11 ENCOUNTER — HOSPITAL ENCOUNTER (EMERGENCY)
Facility: HOSPITAL | Age: 18
Discharge: HOME | End: 2023-12-11
Attending: EMERGENCY MEDICINE | Admitting: EMERGENCY MEDICINE
Payer: COMMERCIAL

## 2023-12-11 VITALS
HEIGHT: 67 IN | DIASTOLIC BLOOD PRESSURE: 78 MMHG | BODY MASS INDEX: 24.33 KG/M2 | RESPIRATION RATE: 17 BRPM | WEIGHT: 155 LBS | SYSTOLIC BLOOD PRESSURE: 120 MMHG | HEART RATE: 62 BPM | TEMPERATURE: 98.1 F | OXYGEN SATURATION: 100 %

## 2023-12-11 DIAGNOSIS — R10.31 ABDOMINAL PAIN, RLQ: Primary | ICD-10-CM

## 2023-12-11 LAB
ALBUMIN SERPL-MCNC: 4 G/DL (ref 3.5–5.2)
ALBUMIN/GLOB SERPL: 1.7 G/DL
ALP SERPL-CCNC: 52 U/L (ref 39–117)
ALT SERPL W P-5'-P-CCNC: 25 U/L (ref 1–33)
ANION GAP SERPL CALCULATED.3IONS-SCNC: 7 MMOL/L (ref 5–15)
AST SERPL-CCNC: 18 U/L (ref 1–32)
B-HCG UR QL: NEGATIVE
BASOPHILS # BLD AUTO: 0.1 10*3/MM3 (ref 0–0.2)
BASOPHILS NFR BLD AUTO: 1.1 % (ref 0–1.5)
BILIRUB SERPL-MCNC: 0.6 MG/DL (ref 0–1.2)
BILIRUB UR QL STRIP: NEGATIVE
BUN SERPL-MCNC: 11 MG/DL (ref 6–20)
BUN/CREAT SERPL: 16.2 (ref 7–25)
CALCIUM SPEC-SCNC: 8.7 MG/DL (ref 8.6–10.5)
CHLORIDE SERPL-SCNC: 106 MMOL/L (ref 98–107)
CLARITY UR: CLEAR
CO2 SERPL-SCNC: 27 MMOL/L (ref 22–29)
COLOR UR: YELLOW
CREAT SERPL-MCNC: 0.68 MG/DL (ref 0.57–1)
DEPRECATED RDW RBC AUTO: 42 FL (ref 37–54)
EGFRCR SERPLBLD CKD-EPI 2021: 128 ML/MIN/1.73
EOSINOPHIL # BLD AUTO: 0.2 10*3/MM3 (ref 0–0.4)
EOSINOPHIL NFR BLD AUTO: 4.3 % (ref 0.3–6.2)
ERYTHROCYTE [DISTWIDTH] IN BLOOD BY AUTOMATED COUNT: 13 % (ref 12.3–15.4)
GLOBULIN UR ELPH-MCNC: 2.3 GM/DL
GLUCOSE SERPL-MCNC: 77 MG/DL (ref 65–99)
GLUCOSE UR STRIP-MCNC: NEGATIVE MG/DL
HCT VFR BLD AUTO: 43.5 % (ref 34–46.6)
HGB BLD-MCNC: 14.4 G/DL (ref 12–15.9)
HGB UR QL STRIP.AUTO: NEGATIVE
HOLD SPECIMEN: NORMAL
HOLD SPECIMEN: NORMAL
KETONES UR QL STRIP: NEGATIVE
LEUKOCYTE ESTERASE UR QL STRIP.AUTO: NEGATIVE
LIPASE SERPL-CCNC: 28 U/L (ref 13–60)
LYMPHOCYTES # BLD AUTO: 1.4 10*3/MM3 (ref 0.7–3.1)
LYMPHOCYTES NFR BLD AUTO: 29.9 % (ref 19.6–45.3)
MCH RBC QN AUTO: 29.1 PG (ref 26.6–33)
MCHC RBC AUTO-ENTMCNC: 33 G/DL (ref 31.5–35.7)
MCV RBC AUTO: 88.1 FL (ref 79–97)
MONOCYTES # BLD AUTO: 0.4 10*3/MM3 (ref 0.1–0.9)
MONOCYTES NFR BLD AUTO: 8.4 % (ref 5–12)
NEUTROPHILS NFR BLD AUTO: 2.6 10*3/MM3 (ref 1.7–7)
NEUTROPHILS NFR BLD AUTO: 56.3 % (ref 42.7–76)
NITRITE UR QL STRIP: NEGATIVE
NRBC BLD AUTO-RTO: 0.1 /100 WBC (ref 0–0.2)
PH UR STRIP.AUTO: 7.5 [PH] (ref 5–8)
PLATELET # BLD AUTO: 201 10*3/MM3 (ref 140–450)
PMV BLD AUTO: 8.4 FL (ref 6–12)
POTASSIUM SERPL-SCNC: 4 MMOL/L (ref 3.5–5.2)
PROT SERPL-MCNC: 6.3 G/DL (ref 6–8.5)
PROT UR QL STRIP: NEGATIVE
RBC # BLD AUTO: 4.93 10*6/MM3 (ref 3.77–5.28)
SODIUM SERPL-SCNC: 140 MMOL/L (ref 136–145)
SP GR UR STRIP: 1.01 (ref 1–1.03)
UROBILINOGEN UR QL STRIP: NORMAL
WBC NRBC COR # BLD AUTO: 4.7 10*3/MM3 (ref 3.4–10.8)
WHOLE BLOOD HOLD COAG: NORMAL
WHOLE BLOOD HOLD SPECIMEN: NORMAL

## 2023-12-11 PROCEDURE — 85025 COMPLETE CBC W/AUTO DIFF WBC: CPT | Performed by: PHYSICIAN ASSISTANT

## 2023-12-11 PROCEDURE — 96374 THER/PROPH/DIAG INJ IV PUSH: CPT

## 2023-12-11 PROCEDURE — 80053 COMPREHEN METABOLIC PANEL: CPT | Performed by: PHYSICIAN ASSISTANT

## 2023-12-11 PROCEDURE — 96375 TX/PRO/DX INJ NEW DRUG ADDON: CPT

## 2023-12-11 PROCEDURE — 83690 ASSAY OF LIPASE: CPT | Performed by: PHYSICIAN ASSISTANT

## 2023-12-11 PROCEDURE — 25010000002 ONDANSETRON PER 1 MG: Performed by: PHYSICIAN ASSISTANT

## 2023-12-11 PROCEDURE — 81003 URINALYSIS AUTO W/O SCOPE: CPT | Performed by: EMERGENCY MEDICINE

## 2023-12-11 PROCEDURE — 74176 CT ABD & PELVIS W/O CONTRAST: CPT

## 2023-12-11 PROCEDURE — 25010000002 MORPHINE PER 10 MG: Performed by: PHYSICIAN ASSISTANT

## 2023-12-11 PROCEDURE — 99284 EMERGENCY DEPT VISIT MOD MDM: CPT

## 2023-12-11 PROCEDURE — 81025 URINE PREGNANCY TEST: CPT | Performed by: PHYSICIAN ASSISTANT

## 2023-12-11 RX ORDER — ONDANSETRON 2 MG/ML
4 INJECTION INTRAMUSCULAR; INTRAVENOUS ONCE
Status: COMPLETED | OUTPATIENT
Start: 2023-12-11 | End: 2023-12-11

## 2023-12-11 RX ORDER — SODIUM CHLORIDE 0.9 % (FLUSH) 0.9 %
10 SYRINGE (ML) INJECTION AS NEEDED
Status: DISCONTINUED | OUTPATIENT
Start: 2023-12-11 | End: 2023-12-11 | Stop reason: HOSPADM

## 2023-12-11 RX ORDER — MORPHINE SULFATE 2 MG/ML
2 INJECTION, SOLUTION INTRAMUSCULAR; INTRAVENOUS ONCE
Status: COMPLETED | OUTPATIENT
Start: 2023-12-11 | End: 2023-12-11

## 2023-12-11 RX ADMIN — MORPHINE SULFATE 2 MG: 2 INJECTION, SOLUTION INTRAMUSCULAR; INTRAVENOUS at 15:04

## 2023-12-11 RX ADMIN — ONDANSETRON 4 MG: 2 INJECTION INTRAMUSCULAR; INTRAVENOUS at 15:03

## 2023-12-11 NOTE — ED PROVIDER NOTES
Subjective   History of Present Illness  Chief Complaint: Abdominal pain    Patient is a 18-year-old female with no pertinent past medical history who presents to the emergency department via EMS for sudden onset abdominal pain.  Patient reports the pain began while she was urinating this morning and is located in the right lower quadrant of her abdomen.  She states the pain is constant and is currently a 6 out of 10 but worsens to an 8 out of 10 at times.  When the pain worsens, she states it radiates towards her vagina and rectum.  Reports dysuria.  Denies vaginal bleeding and discharge.  No rectal discharge or rectal bleeding.  Patient is unsure if she has had a fever but she had some chills after the onset of pain.  She also reports that she was SOA at the time of pain onset, but denies any SOA or chest pain currently.  Patient states her last period was 3 weeks ago but is unsure if she may be pregnant currently.  Denies vaginal discharge or concern for STDs.  Denies nausea, vomiting, diarrhea.  Patient states her last bowel movement was this morning.  Denies any history of abdominal surgeries.     PCP: Miriam Ya    History provided by:  Patient   used: No        Review of Systems   Constitutional:  Positive for chills.   HENT: Negative.     Eyes: Negative.    Respiratory:  Positive for shortness of breath (SOA has now resolved). Negative for cough.    Gastrointestinal:  Positive for abdominal pain and rectal pain. Negative for diarrhea, nausea and vomiting.   Genitourinary:  Positive for dysuria, pelvic pain and vaginal pain. Negative for vaginal bleeding and vaginal discharge.   Musculoskeletal:  Negative for myalgias.   Neurological: Negative.    Psychiatric/Behavioral: Negative.     All other systems reviewed and are negative.      Past Medical History:   Diagnosis Date    Abdominal pain 11/2022    every day since 1-2022    Acute sinus infection 11/2022    Blisters of multiple sites      inner thigh, but healing    Diarrhea 11/2022    Ear infection 11/2022    double    Family history of colitis     Nausea & vomiting 11/2022    on and off       Allergies   Allergen Reactions    Latex, Natural Rubber Other (See Comments)     Blisters that leave scars       Past Surgical History:   Procedure Laterality Date    ADENOIDECTOMY      ATRIAL SEPTAL DEFECT REPAIR  2005    COLONOSCOPY N/A 12/6/2022    Procedure: COLONOSCOPY WITH BIOPSY X1 AREA;  Surgeon: Woodrow Valentino MD;  Location: [facility] ENDOSCOPY;  Service: Gastroenterology;  Laterality: N/A;  POST: NORMAL COLONOSCOPY    ENDOSCOPY N/A 12/6/2022    Procedure: ESOPHAGOGASTRODUODENOSCOPY WITH BIOPSY X 2 AREAS;  Surgeon: Woodrow Valentino MD;  Location: [facility] ENDOSCOPY;  Service: Gastroenterology;  Laterality: N/A;  POST: ESOPHAGITIS    TONSILLECTOMY         No family history on file.    Social History     Socioeconomic History    Marital status: Single   Tobacco Use    Smoking status: Never    Smokeless tobacco: Never   Vaping Use    Vaping Use: Every day    Substances: Nicotine, THC, do not vape dos, stop thc now   Substance and Sexual Activity    Alcohol use: Yes     Comment: rarely    Drug use: Yes     Types: Marijuana     Comment: vapes thc           Objective   Physical Exam  Vitals and nursing note reviewed.   Constitutional:       Appearance: Normal appearance. She is well-developed. She is not ill-appearing or toxic-appearing.   HENT:      Head: Normocephalic and atraumatic.      Mouth/Throat:      Mouth: Mucous membranes are moist.   Eyes:      Pupils: Pupils are equal, round, and reactive to light.   Cardiovascular:      Rate and Rhythm: Normal rate and regular rhythm.      Pulses: Normal pulses.      Heart sounds: Normal heart sounds. No murmur heard.  Pulmonary:      Effort: Pulmonary effort is normal. No respiratory distress.      Breath sounds: Normal breath sounds. No wheezing.   Abdominal:      General: Bowel sounds are  "normal. There is no distension.      Palpations: Abdomen is soft.      Tenderness: There is abdominal tenderness. There is no right CVA tenderness or left CVA tenderness.   Skin:     General: Skin is warm and dry.      Capillary Refill: Capillary refill takes less than 2 seconds.      Findings: No rash.   Neurological:      General: No focal deficit present.      Mental Status: She is alert and oriented to person, place, and time.   Psychiatric:         Mood and Affect: Mood normal.         Behavior: Behavior normal.         Procedures           ED Course  ED Course as of 12/11/23 1729   Mon Dec 11, 2023   1536 Patient reports feeling much better after morphine and is ready for discharge. [MC]      ED Course User Index  [MC] Maricruz Eliana, PA    /78   Pulse 62   Temp 98.1 °F (36.7 °C)   Resp 17   Ht 170.2 cm (67\")   Wt 70.3 kg (155 lb)   SpO2 100%   BMI 24.28 kg/m²   Labs Reviewed   URINALYSIS W/ CULTURE IF INDICATED - Normal    Narrative:     In absence of clinical symptoms, the presence of pyuria, bacteria, and/or nitrites on the urinalysis result does not correlate with infection.  Urine microscopic not indicated.   LIPASE - Normal   PREGNANCY, URINE - Normal   CBC WITH AUTO DIFFERENTIAL - Normal   RAINBOW DRAW    Narrative:     The following orders were created for panel order [place] Draw.  Procedure                               Abnormality         Status                     ---------                               -----------         ------                     Green Top (Gel)[[id number]]                                  Final result               Lavender Top[[id number]]                                     Final result               Gold Top - SST[[id number]]                                   Final result               Light Blue Top[[id number]]                                   Final result                 Please view results for these tests on the individual orders.   COMPREHENSIVE METABOLIC PANEL "    Narrative:     GFR Normal >60  Chronic Kidney Disease <60  Kidney Failure <15     GREEN TOP   LAVENDER TOP   GOLD TOP - SST   LIGHT BLUE TOP   CBC AND DIFFERENTIAL    Narrative:     The following orders were created for panel order CBC & Differential.  Procedure                               Abnormality         Status                     ---------                               -----------         ------                     CBC Auto Differential[[id number]]        Normal              Final result                 Please view results for these tests on the individual orders.     Medications   sodium chloride 0.9 % flush 10 mL (has no administration in time range)   sodium chloride 0.9 % flush 10 mL (has no administration in time range)   morphine injection 2 mg (2 mg Intravenous Given 12/11/23 1504)   ondansetron (ZOFRAN) injection 4 mg (4 mg Intravenous Given 12/11/23 1503)     CT Abdomen Pelvis Stone Protocol    Result Date: 12/11/2023  Impression: No urinary tract calcification or obstruction. No acute process. Electronically Signed: Cassandra Simmons MD  12/11/2023 1:48 PM EST  Workstation ID: [id number]                                            Medical Decision Making  Differential Dx (Includes but not limited to): Ovarian torsion appendicitis ruptured cyst, kidney stone, UTI  Medical Records Reviewed: No pertinent records to review  Labs: On my interpretation, no leukocytosis, no significant electrolyte abnormality, urinalysis negative.  Imaging: On my interpretation, no obvious infectious process  Telemetry: N/A  Testing considered but not ordered: Chest x-ray patient denies chest pain or shortness of breath  Nature of Complaint: Acute  Admission vs Discharge: Discharge  Discussion: While in the ED IV was placed and labs were obtained appropriate PPE was worn during exam and throughout all encounters with the patient.  Patient had the above evaluation.  Patient initially refused pain medication, but then changed  her mind was given IV morphine and Zofran with improvement in her pain.  Lab work reassuring.  CT imaging shows no acute infectious or intra-abdominal abnormalities.  Findings discussed with patient and family at bedside.  Patient be discharged encouraged follow-up with PCP for recheck    Discharge plan and instructions were discussed with the patient who verbalized understanding and is in agreement with the plan, all questions were answered at this time.  Patient is aware of signs symptoms that would require immediate return to the emergency room.  Patient understands importance of following up with primary care provider for further evaluation and worsening concerns as well as blood pressure recheck in the next 4 weeks.    Patient was discharged in improved stable condition with an upright steady gait.    Patient is aware that discharge does not mean that nothing is wrong but indicates no emergencies present and they must continue care with follow-up as given below or physician of their choice.    Problems Addressed:  Abdominal pain, RLQ: acute illness or injury    Amount and/or Complexity of Data Reviewed  Labs: ordered. Decision-making details documented in ED Course.  Radiology: ordered. Decision-making details documented in ED Course.    Risk  Prescription drug management.  Parenteral controlled substances.        Final diagnoses:   Abdominal pain, RLQ       ED Disposition  ED Disposition       ED Disposition   Discharge    Condition   Stable    Comment   --               PATIENT CONNECTION - [facility] [address]  [phone]  Schedule an appointment as soon as possible for a visit in 2 days  As needed, If symptoms worsen         Medication List      No changes were made to your prescriptions during this visit.            Augustina Hebert PA  12/11/23 5120

## 2023-12-11 NOTE — DISCHARGE INSTRUCTIONS
Take Tylenol as needed for pain    Drink plenty fluids    Follow-up with primary care for recheck  Return to the ER for new or worsening symptoms

## 2024-02-19 ENCOUNTER — TELEPHONE (OUTPATIENT)
Dept: NEUROSURGERY | Facility: CLINIC | Age: 19
End: 2024-02-19
Payer: COMMERCIAL

## 2024-02-19 NOTE — TELEPHONE ENCOUNTER
Called needing updated ins info as one on file not eligible. Can come to apt a little early if pt rather give info in person

## 2024-02-22 NOTE — TELEPHONE ENCOUNTER
PATIENT CALLED IN AND STATED THAT HER INSURANCE IS CHANGING AS OF 03/01/24- [place]- [employer].     PLEASE ADVISE- THANK YOU!

## 2024-03-20 ENCOUNTER — TELEPHONE (OUTPATIENT)
Dept: NEUROSURGERY | Facility: CLINIC | Age: 19
End: 2024-03-20
Payer: COMMERCIAL

## 2024-03-20 NOTE — TELEPHONE ENCOUNTER
Caller: Ashley Leone    Relationship to patient: Self    Best call back number: [phone]     Patient is needing:     PATIENT CALLED TO R/S APPT WITH DR CARRENO    HOWEVER SHE ONLY COMPLETED ABOUT 50% OF THE PT, D/T INSURANCE CHANGED, SHE DIDN'T HAVE HEALTH INSURANCE FOR 3 MONTHS    ALSO HAS NOT HAD MRI YET EITHER.    HAS NEW HEALTH INSURANCE, [employer]    PLEASE CALL TO ADVISE WHAT STEPS NEED TO BE DONE BEFORE RETURNING TO OFFICE.          ALSO ASKING IF SOMEONE CAN CALL IN REFILLS ON FLEXERIL AND GABAPENTIN BECAUSE HER PCP HAS SINCE MOVED AWAY-UNDERSTANDS THIS WAS NOT ORIGINALLY PRESCRIBED BY [facility]    PLEASE ADVISE    HAS NEW HEALTH INSURANCE AND WILL BE BRINGING CARD IN NEXT APPT

## 2024-03-20 NOTE — TELEPHONE ENCOUNTER
Called patient. Informed patient per her last visit in September 2023.  stated she needed a MRI. Informed patient that she will need to complete MRI then follow up with  per his last visit note of MRI and f/u in 6 months. Asked patient to upload insurance cards. She stated that that she does not have access to [employer]. Gave her the number to reset her MyChart. Gave patient the number to call central scheduling at [phone] for Yesi Butterfield to perform MRI. Once she has scheduled MRI to call back office so we can get her follow up. Informed her per  at this current moment she will need to go back to her PCP for the medications that she is asking for.

## 2024-04-09 ENCOUNTER — TELEPHONE (OUTPATIENT)
Dept: NEUROSURGERY | Facility: CLINIC | Age: 19
End: 2024-04-09
Payer: COMMERCIAL

## 2024-04-09 NOTE — TELEPHONE ENCOUNTER
Patient called office back. She stated that she cancelled her insurance due to the premium cost. Patient will call office back to schedule MRI in the future once she sorts out her insurance.

## 2024-04-09 NOTE — TELEPHONE ENCOUNTER
"*OKAY FOR HUB TO READ*    Called patient, left VM. Informed patient that I was contacted per FCC/Referrals about her MRI. Unfortunately when authorization was attempted to be obtained from the insurance she has on file [employer] (uploaded 03/28/2024). The insurance company could not confirm that patient was enrolled with their company. Authorization was unable to be obtained for the MRI. Informed patient that they wanted me to reach out to patient to see if she has another insurance or to call her insurance company and make sure that she is enrolled. Or if she has another insurance let us know. Informed her per  last office note on 09/12/2023 that he would like her to obtain the MRI per his recommendation, \"My recommendation would be physical therapy to strengthen her back muscles and improve her ergonomics at work and follow-up in 6 months with an MRI with and without contrast.\"   "

## 2024-07-17 ENCOUNTER — TELEPHONE (OUTPATIENT)
Dept: NEUROSURGERY | Facility: CLINIC | Age: 19
End: 2024-07-17
Payer: COMMERCIAL

## 2024-12-18 ENCOUNTER — APPOINTMENT (OUTPATIENT)
Dept: GENERAL RADIOLOGY | Facility: HOSPITAL | Age: 19
End: 2024-12-18

## 2024-12-18 ENCOUNTER — HOSPITAL ENCOUNTER (OUTPATIENT)
Facility: HOSPITAL | Age: 19
Discharge: HOME | End: 2024-12-18
Attending: EMERGENCY MEDICINE | Admitting: EMERGENCY MEDICINE

## 2024-12-18 VITALS
HEIGHT: 67 IN | WEIGHT: 214.6 LBS | BODY MASS INDEX: 33.68 KG/M2 | OXYGEN SATURATION: 99 % | HEART RATE: 62 BPM | TEMPERATURE: 98.5 F | SYSTOLIC BLOOD PRESSURE: 123 MMHG | RESPIRATION RATE: 20 BRPM | DIASTOLIC BLOOD PRESSURE: 64 MMHG

## 2024-12-18 DIAGNOSIS — B34.9 ACUTE VIRAL SYNDROME: Primary | ICD-10-CM

## 2024-12-18 LAB
AMORPH URATE CRY URNS QL MICRO: ABNORMAL /HPF
B PARAPERT DNA SPEC QL NAA+PROBE: NOT DETECTED
B PERT DNA SPEC QL NAA+PROBE: NOT DETECTED
B-HCG UR QL: NEGATIVE
BACTERIA UR QL AUTO: ABNORMAL /HPF
BILIRUB UR QL STRIP: ABNORMAL
C PNEUM DNA NPH QL NAA+NON-PROBE: NOT DETECTED
CLARITY UR: ABNORMAL
COLOR UR: YELLOW
FLUAV SUBTYP SPEC NAA+PROBE: NOT DETECTED
FLUAV SUBTYP SPEC NAA+PROBE: NOT DETECTED
FLUBV RNA ISLT QL NAA+PROBE: NOT DETECTED
FLUBV RNA ISLT QL NAA+PROBE: NOT DETECTED
GLUCOSE BLDC GLUCOMTR-MCNC: 88 MG/DL (ref 70–130)
GLUCOSE UR STRIP-MCNC: NEGATIVE MG/DL
HADV DNA SPEC NAA+PROBE: NOT DETECTED
HCOV 229E RNA SPEC QL NAA+PROBE: NOT DETECTED
HCOV HKU1 RNA SPEC QL NAA+PROBE: NOT DETECTED
HCOV NL63 RNA SPEC QL NAA+PROBE: NOT DETECTED
HCOV OC43 RNA SPEC QL NAA+PROBE: NOT DETECTED
HGB UR QL STRIP.AUTO: NEGATIVE
HMPV RNA NPH QL NAA+NON-PROBE: NOT DETECTED
HPIV1 RNA ISLT QL NAA+PROBE: NOT DETECTED
HPIV2 RNA SPEC QL NAA+PROBE: NOT DETECTED
HPIV3 RNA NPH QL NAA+PROBE: NOT DETECTED
HPIV4 P GENE NPH QL NAA+PROBE: NOT DETECTED
HYALINE CASTS UR QL AUTO: ABNORMAL /LPF
KETONES UR QL STRIP: ABNORMAL
LEUKOCYTE ESTERASE UR QL STRIP.AUTO: ABNORMAL
M PNEUMO IGG SER IA-ACNC: NOT DETECTED
NITRITE UR QL STRIP: NEGATIVE
PH UR STRIP.AUTO: 5.5 [PH] (ref 5–8)
PROT UR QL STRIP: ABNORMAL
RBC # UR STRIP: ABNORMAL /HPF
REF LAB TEST METHOD: ABNORMAL
RHINOVIRUS RNA SPEC NAA+PROBE: NOT DETECTED
RSV RNA NPH QL NAA+NON-PROBE: NOT DETECTED
SARS-COV-2 RNA RESP QL NAA+PROBE: NOT DETECTED
SARS-COV-2 RNA RESP QL NAA+PROBE: NOT DETECTED
SP GR UR STRIP: >=1.03 (ref 1–1.03)
SQUAMOUS #/AREA URNS HPF: ABNORMAL /HPF
UROBILINOGEN UR QL STRIP: ABNORMAL
WBC # UR STRIP: ABNORMAL /HPF

## 2024-12-18 PROCEDURE — 0202U NFCT DS 22 TRGT SARS-COV-2: CPT

## 2024-12-18 PROCEDURE — G0463 HOSPITAL OUTPT CLINIC VISIT: HCPCS

## 2024-12-18 PROCEDURE — 81001 URINALYSIS AUTO W/SCOPE: CPT | Performed by: EMERGENCY MEDICINE

## 2024-12-18 PROCEDURE — 81025 URINE PREGNANCY TEST: CPT | Performed by: EMERGENCY MEDICINE

## 2024-12-18 PROCEDURE — 71045 X-RAY EXAM CHEST 1 VIEW: CPT

## 2024-12-18 PROCEDURE — 82948 REAGENT STRIP/BLOOD GLUCOSE: CPT

## 2024-12-18 PROCEDURE — 87636 SARSCOV2 & INF A&B AMP PRB: CPT | Performed by: EMERGENCY MEDICINE

## 2024-12-18 RX ORDER — FLUTICASONE PROPIONATE 50 MCG
2 SPRAY, SUSPENSION (ML) NASAL DAILY
Qty: 18.2 G | Refills: 0 | Status: SHIPPED | OUTPATIENT
Start: 2024-12-18

## 2024-12-18 RX ORDER — CETIRIZINE HYDROCHLORIDE, PSEUDOEPHEDRINE HYDROCHLORIDE 5; 120 MG/1; MG/1
1 TABLET, FILM COATED, EXTENDED RELEASE ORAL 2 TIMES DAILY
Qty: 14 TABLET | Refills: 0 | Status: SHIPPED | OUTPATIENT
Start: 2024-12-18 | End: 2024-12-25

## 2024-12-18 RX ORDER — ONDANSETRON 4 MG/1
4 TABLET, ORALLY DISINTEGRATING ORAL EVERY 8 HOURS PRN
Qty: 21 TABLET | Refills: 0 | Status: SHIPPED | OUTPATIENT
Start: 2024-12-18 | End: 2024-12-25

## 2024-12-18 NOTE — Clinical Note
[facility] [place] [address] [place] [address] [place] [address]  Phone: [phone]    Ashley Leone was seen and treated in our emergency department on 12/18/2024.  She may return to work on 12/21/2024.         Thank you for choosing [facility].    Tone Palumbo Jr., NETTA

## 2024-12-18 NOTE — FSED PROVIDER NOTE
"Subjective   History of Present Illness  19-year-old female reports that she was at work this morning when she began feeling lightheaded dizzy she feels like she has fluid in her head.  She reports feeling mildly nauseated.  She reports that at the time she also felt like her heart was racing intermittently.  She reports that the multiple toothed of symptoms concerned her and that she came here to this facility.  Currently she is denying chest pain shortness of breath vomiting and diarrhea.  She denies fever.  She reports that she had not eaten anything this morning.  She additionally reports that many of her coworkers are sick with everything.  Additionally reports that she vapes daily        Review of Systems   All other systems reviewed and are negative.      Past Medical History:   Diagnosis Date    Abdominal pain 11/2022    every day since 1-2022    Acute sinus infection 11/2022    Blisters of multiple sites     inner thigh, but healing    Diarrhea 11/2022    Ear infection 11/2022    double    Family history of colitis     Nausea & vomiting 11/2022    on and off       Allergies   Allergen Reactions    Latex, Natural Rubber Other (See Comments)     Blisters that leave scars    Ibuprofen Other (See Comments)     \"Kills off esophagus.\"       Past Surgical History:   Procedure Laterality Date    ADENOIDECTOMY      ATRIAL SEPTAL DEFECT REPAIR  2005    COLONOSCOPY N/A 12/6/2022    Procedure: COLONOSCOPY WITH BIOPSY X1 AREA;  Surgeon: Woodrow Valentino MD;  Location: [facility] ENDOSCOPY;  Service: Gastroenterology;  Laterality: N/A;  POST: NORMAL COLONOSCOPY    ENDOSCOPY N/A 12/6/2022    Procedure: ESOPHAGOGASTRODUODENOSCOPY WITH BIOPSY X 2 AREAS;  Surgeon: Woodrow Valentino MD;  Location: [facility] ENDOSCOPY;  Service: Gastroenterology;  Laterality: N/A;  POST: ESOPHAGITIS    TONSILLECTOMY         History reviewed. No pertinent family history.    Social History     Socioeconomic History    Marital status: " Single   Tobacco Use    Smoking status: Never    Smokeless tobacco: Never   Vaping Use    Vaping status: Every Day    Substances: Nicotine, THC, do not vape dos, stop thc now   Substance and Sexual Activity    Alcohol use: Yes     Comment: rarely    Drug use: Yes     Types: Marijuana     Comment: vapes thc           Objective   Physical Exam  Vitals and nursing note reviewed.   Constitutional:       General: She is not in acute distress.     Appearance: Normal appearance. She is not toxic-appearing.   HENT:      Head: Normocephalic and atraumatic.      Ears:      Comments: Large amount of earwax bilaterally, tympanic membranes are occluded but no pain on exam     Nose: Nose normal.      Mouth/Throat:      Mouth: Mucous membranes are moist.      Pharynx: Oropharynx is clear.   Eyes:      Extraocular Movements: Extraocular movements intact.      Conjunctiva/sclera: Conjunctivae normal.      Pupils: Pupils are equal, round, and reactive to light.   Cardiovascular:      Rate and Rhythm: Normal rate.      Pulses: Normal pulses.   Pulmonary:      Effort: Pulmonary effort is normal. No respiratory distress.      Breath sounds: Normal breath sounds. No stridor. No wheezing, rhonchi or rales.   Abdominal:      General: Abdomen is flat.      Palpations: Abdomen is soft.      Tenderness: There is no abdominal tenderness.   Musculoskeletal:         General: Normal range of motion.      Cervical back: Normal range of motion and neck supple.   Skin:     General: Skin is warm.      Capillary Refill: Capillary refill takes less than 2 seconds.   Neurological:      General: No focal deficit present.      Mental Status: She is alert and oriented to person, place, and time. Mental status is at baseline.         Procedures           ED Course  ED Course as of 12/18/24 1250   Wed Dec 18, 2024   0955 COVID influenza not detected [WF]   1037 Urinalysis shows ketones small amount of bilirubin protein small amount of leukocytes but is  negative for blood and nitrite    Point-of-care glucose 88    hCG urine is negative [WF]   1053 Chest x-ray  Impression:  No acute cardiopulmonary abnormality is identified.   [WF]      ED Course User Index  [WF] Tone Palumbo Jr., NETTA                                           Medical Decision Making  Patient's vital signs are normal she is not in any acute respiratory distress.    Will check urinalysis point-of-care glucose swab for COVID and flu.  I have also ordered a chest x-ray given patient's history of vaping    Urinalysis COVID flu unremarkable chest x-ray is negative for acute findings point-of-care glucose 88    I suspect patient has a virus which is going around her workplace.  She is agreeable to discharge home with Flonase Zyrtec-D to help with sensation of fluid in her ears and also Zofran for intermittent nausea.    Will provide a work note per her request.  Patient has ride home with her mom    Problems Addressed:  Acute viral syndrome: complicated acute illness or injury    Amount and/or Complexity of Data Reviewed  Labs: ordered.  Radiology: ordered.    Risk  OTC drugs.  Prescription drug management.        Final diagnoses:   Acute viral syndrome       ED Disposition  ED Disposition       ED Disposition   Discharge    Condition   Stable    Comment   --               PATIENT CONNECTION - [facility] [address]  [phone]  Schedule an appointment as soon as possible for a visit   Or follow-up with your primary care provider         Medication List        New Prescriptions      cetirizine-pseudoephedrine 5-120 MG per 12 hr tablet  Commonly known as: ZyrTEC-D  Take 1 tablet by mouth 2 (Two) Times a Day for 7 days.     fluticasone 50 MCG/ACT nasal spray  Commonly known as: FLONASE  Administer 2 sprays into the nostril(s) as directed by provider Daily.     ondansetron ODT 4 MG disintegrating tablet  Commonly known as: ZOFRAN-ODT  Place 1 tablet on the tongue Every 8 (Eight) Hours As  Needed for Vomiting or Nausea for up to 7 days.               Where to Get Your Medications        These medications were sent to [facility] [id number] - [place], [place] - [address] [place] [address] [address]  - [phone] [facility] [phone] FX  [address] [place] [address] [place] [address]      Phone: [phone]   cetirizine-pseudoephedrine 5-120 MG per 12 hr tablet  fluticasone 50 MCG/ACT nasal spray  ondansetron ODT 4 MG disintegrating tablet

## 2024-12-18 NOTE — DISCHARGE INSTRUCTIONS
Recommend alternating 800 mg ibuprofen with 1000 mg of Tylenol every 4 days    Take Zofran for nausea    Increase your fluid intake with water Pedialyte.    Begin Flonase nasal steroid spray to help with nasal congestion and fluid in your ears recommend Zyrtec-D to help with nasal congestion and fluid in ear    Apply warm compresses to your ear several times daily to facilitate removal of your    Follow-up with family doctor as needed return to ER for worsening send

## 2024-12-21 ENCOUNTER — HOSPITAL ENCOUNTER (OUTPATIENT)
Facility: HOSPITAL | Age: 19
Discharge: HOME | End: 2024-12-21
Attending: EMERGENCY MEDICINE | Admitting: EMERGENCY MEDICINE

## 2024-12-21 VITALS
RESPIRATION RATE: 18 BRPM | HEART RATE: 70 BPM | DIASTOLIC BLOOD PRESSURE: 83 MMHG | SYSTOLIC BLOOD PRESSURE: 147 MMHG | BODY MASS INDEX: 33.27 KG/M2 | WEIGHT: 212 LBS | HEIGHT: 67 IN | TEMPERATURE: 99 F | OXYGEN SATURATION: 98 %

## 2024-12-21 DIAGNOSIS — M79.12 STERNOCLEIDOMASTOID MUSCLE TENDERNESS: Primary | ICD-10-CM

## 2024-12-21 PROCEDURE — G0463 HOSPITAL OUTPT CLINIC VISIT: HCPCS | Performed by: EMERGENCY MEDICINE

## 2024-12-21 RX ORDER — CYCLOBENZAPRINE HCL 10 MG
10 TABLET ORAL 3 TIMES DAILY PRN
Qty: 30 TABLET | Refills: 0 | Status: SHIPPED | OUTPATIENT
Start: 2024-12-21

## 2024-12-21 NOTE — FSED PROVIDER NOTE
"Subjective   History of Present Illness  19-year-old female presented to the urgent care with chief complaint of right-sided neck pain.  Patient states she developed neck pain approximately 3 days ago in the middle of the night.  Patient thought she had \"crick \"in her neck..  Patient has continued pain.  2 doses of Tylenol did not help the pain this morning.  Patient is unable to take NSAIDs due to her history of esophagitis        Review of Systems   All other systems reviewed and are negative.      Past Medical History:   Diagnosis Date    Abdominal pain 11/2022    every day since 1-2022    Acute sinus infection 11/2022    Blisters of multiple sites     inner thigh, but healing    Diarrhea 11/2022    Ear infection 11/2022    double    Family history of colitis     Nausea & vomiting 11/2022    on and off       Allergies   Allergen Reactions    Latex, Natural Rubber Other (See Comments)     Blisters that leave scars    Ibuprofen Other (See Comments)     \"Kills off esophagus.\"       Past Surgical History:   Procedure Laterality Date    ADENOIDECTOMY      ATRIAL SEPTAL DEFECT REPAIR  2005    COLONOSCOPY N/A 12/6/2022    Procedure: COLONOSCOPY WITH BIOPSY X1 AREA;  Surgeon: Woodrow Valentino MD;  Location: [facility] ENDOSCOPY;  Service: Gastroenterology;  Laterality: N/A;  POST: NORMAL COLONOSCOPY    ENDOSCOPY N/A 12/6/2022    Procedure: ESOPHAGOGASTRODUODENOSCOPY WITH BIOPSY X 2 AREAS;  Surgeon: Woodrow Valentino MD;  Location: [facility] ENDOSCOPY;  Service: Gastroenterology;  Laterality: N/A;  POST: ESOPHAGITIS    TONSILLECTOMY         History reviewed. No pertinent family history.    Social History     Socioeconomic History    Marital status: Single   Tobacco Use    Smoking status: Never    Smokeless tobacco: Never   Vaping Use    Vaping status: Every Day    Substances: Nicotine, THC, do not vape dos, stop thc now   Substance and Sexual Activity    Alcohol use: Yes     Comment: rarely    Drug use: Yes    "  Types: Marijuana     Comment: vapes thc           Objective   Physical Exam  Vitals and nursing note reviewed.   Constitutional:       Appearance: She is obese.   HENT:      Head: Normocephalic and atraumatic.   Neck:      Comments: Increase pain right sternocleidomastoid with turning head to the left side.  Point tenderness at the insertion of the sternocleidomastoid to the sternum  Cardiovascular:      Rate and Rhythm: Normal rate and regular rhythm.   Pulmonary:      Effort: Pulmonary effort is normal.   Musculoskeletal:         General: Normal range of motion.      Cervical back: Neck supple. Tenderness (Right sternocleidomastoid tenderness at the sternal insertion) present. No rigidity.   Lymphadenopathy:      Cervical: No cervical adenopathy.   Neurological:      General: No focal deficit present.      Mental Status: She is alert and oriented to person, place, and time.   Psychiatric:         Mood and Affect: Mood normal.         Behavior: Behavior normal.         Procedures           ED Course                                           Medical Decision Making      Final diagnoses:   Sternocleidomastoid muscle tenderness       ED Disposition  ED Disposition       ED Disposition   Discharge    Condition   Stable    Comment   --               [place] [address] [place] [id number] [phone]    If symptoms worsen         Medication List        Changed      cyclobenzaprine 10 MG tablet  Commonly known as: FLEXERIL  Take 1 tablet by mouth 3 (Three) Times a Day As Needed for Muscle Spasms. for muscle spams  What changed: reasons to take this               Where to Get Your Medications        These medications were sent to [facility] [id number] - [place], [place] - [address] [place] [address] [address]  - [phone]  - [phone] [address] [place] [address]      Phone: [phone]   cyclobenzaprine 10 MG tablet

## 2024-12-21 NOTE — Clinical Note
[facility] FSED [place] [address] [place] [address] [place] [address]  Phone: [phone]    Ashley Leone was seen and treated in our emergency department on 12/21/2024.  She may return to work on 12/22/2024.         Thank you for choosing [facility].    Zander Malik MD

## 2025-05-14 ENCOUNTER — HOSPITAL ENCOUNTER (OUTPATIENT)
Facility: HOSPITAL | Age: 20
Discharge: HOME | End: 2025-05-14
Attending: EMERGENCY MEDICINE | Admitting: EMERGENCY MEDICINE
Payer: MEDICAID

## 2025-05-14 VITALS
OXYGEN SATURATION: 98 % | HEART RATE: 61 BPM | WEIGHT: 211.39 LBS | BODY MASS INDEX: 33.97 KG/M2 | HEIGHT: 66 IN | TEMPERATURE: 99 F | DIASTOLIC BLOOD PRESSURE: 75 MMHG | SYSTOLIC BLOOD PRESSURE: 126 MMHG | RESPIRATION RATE: 18 BRPM

## 2025-05-14 DIAGNOSIS — J02.9 SORE THROAT: Primary | ICD-10-CM

## 2025-05-14 LAB — STREP A PCR: NOT DETECTED

## 2025-05-14 PROCEDURE — 99213 OFFICE O/P EST LOW 20 MIN: CPT | Performed by: NURSE PRACTITIONER

## 2025-05-14 PROCEDURE — G0463 HOSPITAL OUTPT CLINIC VISIT: HCPCS | Performed by: NURSE PRACTITIONER

## 2025-05-14 PROCEDURE — 87651 STREP A DNA AMP PROBE: CPT | Performed by: EMERGENCY MEDICINE

## 2025-05-14 NOTE — DISCHARGE INSTRUCTIONS
Follow-up with primary care.  Take Tylenol as needed for pain.  You can consider taking Zyrtec-D over-the-counter for symptoms.  It looks like you have been on this in the past.    Be sure to drink plenty of fluids, and advance diet as tolerated.  Return for worsening symptoms such as coughing up blood, vomiting up blood or having bloody diarrhea, chest pain or shortness of breath.

## 2025-05-14 NOTE — Clinical Note
[facility] FSED [place] [id number] [place] [address] [place] [address]  Phone: [phone]    Ashley Leone was seen and treated in our emergency department on 5/14/2025.  She may return to work on 05/15/2025.         Thank you for choosing [facility].    Cora Genao APRN

## 2025-05-14 NOTE — FSED PROVIDER NOTE
"Subjective   History of Present Illness  19-year-old female presents with a 4-day history of sore throat and nasal drainage.  She reported that her boyfriend was sick but he was only sick for 1 day.  She is concerned about strep, she wants to make sure there is not anything that she needs an antibiotic for.    History provided by:  Patient   used: No        Review of Systems   Constitutional:  Negative for fever.   HENT:  Negative for sore throat, trouble swallowing and voice change.    Respiratory:  Negative for cough.    Gastrointestinal:  Negative for diarrhea, nausea and vomiting.   All other systems reviewed and are negative.      Past Medical History:   Diagnosis Date    Abdominal pain 11/2022    every day since 1-2022    Acute sinus infection 11/2022    Blisters of multiple sites     inner thigh, but healing    Diarrhea 11/2022    Ear infection 11/2022    double    Family history of colitis     Nausea & vomiting 11/2022    on and off       Allergies   Allergen Reactions    Latex, Natural Rubber Other (See Comments)     Blisters that leave scars    Ibuprofen Other (See Comments)     \"Kills off esophagus.\"       Past Surgical History:   Procedure Laterality Date    ADENOIDECTOMY      ATRIAL SEPTAL DEFECT REPAIR  2005    COLONOSCOPY N/A 12/6/2022    Procedure: COLONOSCOPY WITH BIOPSY X1 AREA;  Surgeon: Woodrow Valentino MD;  Location: [facility] ENDOSCOPY;  Service: Gastroenterology;  Laterality: N/A;  POST: NORMAL COLONOSCOPY    ENDOSCOPY N/A 12/6/2022    Procedure: ESOPHAGOGASTRODUODENOSCOPY WITH BIOPSY X 2 AREAS;  Surgeon: Woodrow Valentino MD;  Location: [facility] ENDOSCOPY;  Service: Gastroenterology;  Laterality: N/A;  POST: ESOPHAGITIS    TONSILLECTOMY         History reviewed. No pertinent family history.    Social History     Socioeconomic History    Marital status: Single   Tobacco Use    Smoking status: Never    Smokeless tobacco: Never   Vaping Use    Vaping status: " Every Day    Substances: Nicotine, THC, do not vape dos, stop thc now   Substance and Sexual Activity    Alcohol use: Yes     Comment: rarely    Drug use: Yes     Types: Marijuana     Comment: vapes thc           Objective   Physical Exam  Vitals and nursing note reviewed.   Constitutional:       General: She is not in acute distress.     Appearance: She is well-developed. She is not ill-appearing, toxic-appearing or diaphoretic.   HENT:      Right Ear: Tympanic membrane and ear canal normal.      Left Ear: Tympanic membrane and ear canal normal.      Mouth/Throat:      Mouth: Mucous membranes are moist. No oral lesions.      Pharynx: Posterior oropharyngeal erythema (Mild) present. No pharyngeal swelling, oropharyngeal exudate or uvula swelling.      Tonsils: No tonsillar exudate or tonsillar abscesses.   Cardiovascular:      Rate and Rhythm: Normal rate and regular rhythm.      Heart sounds: Normal heart sounds. No murmur heard.     No friction rub. No gallop.   Pulmonary:      Effort: Pulmonary effort is normal. No respiratory distress.      Breath sounds: Normal breath sounds. No stridor. No wheezing, rhonchi or rales.   Skin:     General: Skin is warm and dry.      Capillary Refill: Capillary refill takes less than 2 seconds.   Neurological:      Mental Status: She is alert and oriented to person, place, and time.   Psychiatric:         Mood and Affect: Mood normal.         Behavior: Behavior normal.         Procedures           ED Course  ED Course as of 05/14/25 0952   Wed May 14, 2025   0929 STREP A PCR: Not Detected [SJ]      ED Course User Index  [SJ] Cora Genao APRN                                           Medical Decision Making  19-year-old female presents with a 4-day history of sore throat and nasal drainage.  She reported that her boyfriend was sick but he was only sick for 1 day.  She is concerned about strep, she wants to make sure she does not require an antibiotic.  Patient's testing was  negative for strep.  I did offer her testing for COVID and flu and the respiratory panel.  I did advise her this would not change the course of treatment.  Patient would just like a work note.  I have advised taking some Zyrtec-D or Allegra-D or Claritin-D over-the-counter for her symptoms and see if that helps with her sore throat.  Patient verbalized understanding.    Amount and/or Complexity of Data Reviewed  Labs:  Decision-making details documented in ED Course.        Final diagnoses:   Sore throat       ED Disposition  ED Disposition       ED Disposition   Discharge    Condition   Stable    Comment   --               PATIENT CONNECTION - [facility] [address]  [phone]  Call            Medication List      No changes were made to your prescriptions during this visit.

## (undated) DEVICE — PK ENDO GI 50

## (undated) DEVICE — BITEBLOCK ENDO W/STRAP 60F A/ LF DISP